# Patient Record
Sex: MALE | Race: WHITE | NOT HISPANIC OR LATINO | Employment: UNEMPLOYED | URBAN - METROPOLITAN AREA
[De-identification: names, ages, dates, MRNs, and addresses within clinical notes are randomized per-mention and may not be internally consistent; named-entity substitution may affect disease eponyms.]

---
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
## 2023-05-04 ENCOUNTER — RA CDI HCC (OUTPATIENT)
Dept: OTHER | Facility: HOSPITAL | Age: 18
End: 2023-05-04

## 2023-05-11 ENCOUNTER — OFFICE VISIT (OUTPATIENT)
Dept: PEDIATRICS CLINIC | Facility: CLINIC | Age: 18
End: 2023-05-11

## 2023-05-11 VITALS
SYSTOLIC BLOOD PRESSURE: 116 MMHG | HEIGHT: 73 IN | HEART RATE: 72 BPM | RESPIRATION RATE: 16 BRPM | DIASTOLIC BLOOD PRESSURE: 76 MMHG | BODY MASS INDEX: 31.68 KG/M2 | WEIGHT: 239 LBS | TEMPERATURE: 98.4 F

## 2023-05-11 DIAGNOSIS — E78.5 HYPERLIPIDEMIA WITH TARGET LOW DENSITY LIPOPROTEIN (LDL) CHOLESTEROL LESS THAN 130 MG/DL: ICD-10-CM

## 2023-05-11 DIAGNOSIS — M41.9 SCOLIOSIS OF THORACIC SPINE, UNSPECIFIED SCOLIOSIS TYPE: ICD-10-CM

## 2023-05-11 DIAGNOSIS — Z01.10 ENCOUNTER FOR HEARING SCREENING WITHOUT ABNORMAL FINDINGS: ICD-10-CM

## 2023-05-11 DIAGNOSIS — K13.0 CHEILITIS: ICD-10-CM

## 2023-05-11 DIAGNOSIS — Z00.00 ANNUAL PHYSICAL EXAM: Primary | ICD-10-CM

## 2023-05-11 DIAGNOSIS — Z01.00 ENCOUNTER FOR VISION SCREENING: ICD-10-CM

## 2023-05-11 DIAGNOSIS — Z13.31 ENCOUNTER FOR SCREENING FOR DEPRESSION: ICD-10-CM

## 2023-05-11 DIAGNOSIS — Z23 NEED FOR VACCINATION: ICD-10-CM

## 2023-05-11 PROBLEM — B35.3 TINEA PEDIS OF BOTH FEET: Status: RESOLVED | Noted: 2020-01-15 | Resolved: 2023-05-11

## 2023-05-11 NOTE — PATIENT INSTRUCTIONS
Sexually Transmitted Diseases   WHAT YOU NEED TO KNOW:   What is a sexually transmitted disease (STD)? An STD means signs or symptoms of a sexually transmitted infection (STI) have developed  An STI happens when bacteria or a virus are spread through oral, genital, or anal sex  Some examples of STDs are HIV, chlamydia, syphilis, and gonorrhea  What are the signs and symptoms of an STD? You may have one or more of the following, depending on the STD:  • Blisters, warts, sores, or a rash on your skin that may be painful    • Discharge from the penis, vagina, or anus that may have a foul smell    • Fever, muscle pain, or swollen lymph nodes in the groin    • Inflammation and itching    • Pelvic or abdominal pain, or pain during sex or when urinating    • Sore throat, mouth ulcers, or trouble swallowing    • Vaginal bleeding or spotting after sex (women)    What increases my risk for an STD? • Unprotected sex    • Being female    • Alcohol or illegal drug use    • More than 1 sex partner    • Not being vaccinated against certain STIs    • A weak immune system    • Open sores or cuts, such as body piercings    How is an STD diagnosed? Your healthcare provider will examine you and ask about your symptoms  He or she may ask you about your sex history or other medical conditions  He or she will ask if you have had an STD before  • Blood and urine tests  may show an infection and what kind it is  • A sample of discharge  may show what is causing your STD  • A pelvic exam  may be used if you are a woman  A pelvic exam is used to check your vagina, cervix, and other organs  How is an STD treated? Treatment depends on the STD you have  Antibiotics may be given for a bacterial infection  Antivirals may be given for a viral infection  Antifungals may be given for a fungal infection, such as a yeast infection  Early treatment may decrease the risk for certain cancers   Early treatment can also help prevent infertility  How can I help prevent the spread of an STI? Ask your healthcare provider for more information about the following safe sex practices:  • Use a male or female condom during sex  This includes oral, genital, or anal sex  Use a new condom each time  Condoms help prevent pregnancy and STIs  Use latex condoms, if possible  Lambskin (also called sheepskin or natural membrane) condoms do not protect against STIs  A polyurethane condom can be used if you or your partner is allergic to latex  Condoms should be used with a second form of birth control to help prevent pregnancy and STIs  Do not use male and female condoms together  • Do not have sex with someone who has an STI or STD  This includes oral and anal sex  • Limit sex partners  Ask about your partner's sex history before you have sex  • Get screened regularly if you are sexually active  Common tests include chlamydia, gonorrhea, HIV, hepatitis, and syphilis  • Tell your sex partners if you have an STI  Your partners may need to be tested and treated  Do not have sex while you are being treated for an STI  Do not have sex with a partner who is being treated  • Ask about medicines to lower your risk for some STIs:      ? Vaccines  can help protect you from hepatitis A, hepatitis B, and the human papillomavirus (HPV)  The HPV vaccine is usually given at 11 years, but it may be given through 26 years to both females and males  Your provider can give you more information on vaccines to prevent STIs  ? Pre-exposure prophylaxis (PrEP)  may be given if you are at high risk for HIV  PrEP is taken every day to prevent the virus from fully infecting the body  • If you are a woman, do not douche  Douching upsets the normal balance of bacteria found in your vagina  It does not prevent or clear up vaginal infections  When should I seek immediate care? • You have genital swelling or pain, or unusual bleeding      • You have joint pain, a rash, swollen lymph nodes, or night sweats  • You have severe abdominal pain  When should I call my doctor? • You have a fever  • Your symptoms do not go away or get worse, even after treatment  • You have bleeding or pain during sex  • You or your female partner may be pregnant  • You have questions or concerns about your condition or care  CARE AGREEMENT:   You have the right to help plan your care  Learn about your health condition and how it may be treated  Discuss treatment options with your healthcare providers to decide what care you want to receive  You always have the right to refuse treatment  The above information is an  only  It is not intended as medical advice for individual conditions or treatments  Talk to your doctor, nurse or pharmacist before following any medical regimen to see if it is safe and effective for you  © Copyright Chuy Caballero 2022 Information is for End User's use only and may not be sold, redistributed or otherwise used for commercial purposes  Wellness Visit for Adults   AMBULATORY CARE:   A wellness visit  is when you see your healthcare provider to get screened for health problems  Your healthcare provider will also give you advice on how to stay healthy  Write down your questions so you remember to ask them  Ask your healthcare provider how often you should have a wellness visit  What happens at a wellness visit:  Your healthcare provider will ask about your health, and your family history of health problems  This includes high blood pressure, heart disease, and cancer  He or she will ask if you have symptoms that concern you, if you smoke, and about your mood  You may also be asked about your intake of medicines, supplements, food, and alcohol  Any of the following may be done:  • Your weight  will be checked  Your height may also be checked so your body mass index (BMI) can be calculated   Your BMI shows if you are at a healthy weight  • Your blood pressure  and heart rate will be checked  Your temperature may also be checked  • Blood and urine tests  may be done  Blood tests may be done to check your cholesterol levels  Abnormal cholesterol levels increase your risk for heart disease and stroke  You may also need a blood or urine test to check for diabetes if you are at increased risk  Urine tests may be done to look for signs of an infection or kidney disease  • A physical exam  includes checking your heartbeat and lungs with a stethoscope  Your healthcare provider may also check your skin to look for sun damage  • Screening tests  may be recommended  A screening test is done to check for diseases that may not cause symptoms  The screening tests you may need depend on your age, gender, family history, and lifestyle habits  For example, colorectal screening may be recommended if you are 48years old or older  Screening tests you need if you are a woman:   • A Pap smear  is used to screen for cervical cancer  Pap smears are usually done every 3 to 5 years depending on your age  You may need them more often if you have had abnormal Pap smear test results in the past  Ask your healthcare provider how often you should have a Pap smear  • A mammogram  is an x-ray of your breasts to screen for breast cancer  Experts recommend mammograms every 2 years starting at age 48 years  You may need a mammogram at age 52 years or younger if you have an increased risk for breast cancer  Talk to your healthcare provider about when you should start having mammograms and how often you need them  Vaccines you may need:   • Get an influenza vaccine  every year  The influenza vaccine protects you from the flu  Several types of viruses cause the flu  The viruses change over time, so new vaccines are made each year  • Get a tetanus-diphtheria (Td) booster vaccine  every 10 years  This vaccine protects you against tetanus and diphtheria  Tetanus is a severe infection that may cause painful muscle spasms and lockjaw  Diphtheria is a severe bacterial infection that causes a thick covering in the back of your mouth and throat  • Get a human papillomavirus (HPV) vaccine  if you are female and aged 23 to 32 or male 23 to 24 and never received it  This vaccine protects you from HPV infection  HPV is the most common infection spread by sexual contact  HPV may also cause vaginal, penile, and anal cancers  • Get a pneumococcal vaccine  if you are aged 72 years or older  The pneumococcal vaccine is an injection given to protect you from pneumococcal disease  Pneumococcal disease is an infection caused by pneumococcal bacteria  The infection may cause pneumonia, meningitis, or an ear infection  • Get a shingles vaccine  if you are 60 or older, even if you have had shingles before  The shingles vaccine is an injection to protect you from the varicella-zoster virus  This is the same virus that causes chickenpox  Shingles is a painful rash that develops in people who had chickenpox or have been exposed to the virus  How to eat healthy:  My Plate is a model for planning healthy meals  It shows the types and amounts of foods that should go on your plate  Fruits and vegetables make up about half of your plate, and grains and protein make up the other half  A serving of dairy is included on the side of your plate  The amount of calories and serving sizes you need depends on your age, gender, weight, and height  Examples of healthy foods are listed below:  • Eat a variety of vegetables  such as dark green, red, and orange vegetables  You can also include canned vegetables low in sodium (salt) and frozen vegetables without added butter or sauces  • Eat a variety of fresh fruits , canned fruit in 100% juice, frozen fruit, and dried fruit  • Include whole grains  At least half of the grains you eat should be whole grains   Examples include whole-wheat bread, wheat pasta, brown rice, and whole-grain cereals such as oatmeal     • Eat a variety of protein foods such as seafood (fish and shellfish), lean meat, and poultry without skin (turkey and chicken)  Examples of lean meats include pork leg, shoulder, or tenderloin, and beef round, sirloin, tenderloin, and extra lean ground beef  Other protein foods include eggs and egg substitutes, beans, peas, soy products, nuts, and seeds  • Choose low-fat dairy products such as skim or 1% milk or low-fat yogurt, cheese, and cottage cheese  • Limit unhealthy fats  such as butter, hard margarine, and shortening  Exercise:  Exercise at least 30 minutes per day on most days of the week  Some examples of exercise include walking, biking, dancing, and swimming  You can also fit in more physical activity by taking the stairs instead of the elevator or parking farther away from stores  Include muscle strengthening activities 2 days each week  Regular exercise provides many health benefits  It helps you manage your weight, and decreases your risk for type 2 diabetes, heart disease, stroke, and high blood pressure  Exercise can also help improve your mood  Ask your healthcare provider about the best exercise plan for you  General health and safety guidelines:   • Do not smoke  Nicotine and other chemicals in cigarettes and cigars can cause lung damage  Ask your healthcare provider for information if you currently smoke and need help to quit  E-cigarettes or smokeless tobacco still contain nicotine  Talk to your healthcare provider before you use these products  • Limit alcohol  A drink of alcohol is 12 ounces of beer, 5 ounces of wine, or 1½ ounces of liquor  • Lose weight, if needed  Being overweight increases your risk of certain health conditions  These include heart disease, high blood pressure, type 2 diabetes, and certain types of cancer  • Protect your skin  Do not sunbathe or use tanning beds  Use sunscreen with a SPF 15 or higher  Apply sunscreen at least 15 minutes before you go outside  Reapply sunscreen every 2 hours  Wear protective clothing, hats, and sunglasses when you are outside  • Drive safely  Always wear your seatbelt  Make sure everyone in your car wears a seatbelt  A seatbelt can save your life if you are in an accident  Do not use your cell phone when you are driving  This could distract you and cause an accident  Pull over if you need to make a call or send a text message  • Practice safe sex  Use latex condoms if are sexually active and have more than one partner  Your healthcare provider may recommend screening tests for sexually transmitted infections (STIs)  • Wear helmets, lifejackets, and protective gear  Always wear a helmet when you ride a bike or motorcycle, go skiing, or play sports that could cause a head injury  Wear protective equipment when you play sports  Wear a lifejacket when you are on a boat or doing water sports  © Copyright Corinne Furry 2022 Information is for End User's use only and may not be sold, redistributed or otherwise used for commercial purposes  The above information is an  only  It is not intended as medical advice for individual conditions or treatments  Talk to your doctor, nurse or pharmacist before following any medical regimen to see if it is safe and effective for you

## 2023-05-11 NOTE — PROGRESS NOTES
[id number]    NAME: Miguel Angel Webster  AGE: 21 y o  SEX: male  : 2005     DATE: 2023     Assessment and Plan:     Problem List Items Addressed This Visit        Digestive    Cheilitis    Relevant Medications    mupirocin (BACTROBAN) 2 % ointment       Musculoskeletal and Integument    Scoliosis of thoracic spine       Other    BMI 31 0-31 9,adult    Encounter for hearing screening without abnormal findings - Primary    Hyperlipidemia with target low density lipoprotein (LDL) cholesterol less than 130 mg/dL    Relevant Orders    Lipid panel    Need for vaccination    Relevant Orders    MENINGOCOCCAL B RECOMBINANT (Completed)       Immunizations and preventive care screenings were discussed with patient today  Appropriate education was printed on patient's after visit summary  Counseling:  Injury prevention: discussed safety/seat belts, safety helmets, smoke detectors, carbon dioxide detectors, and smoking near bedding or upholstery  Exercise: the importance of regular exercise/physical activity was discussed  Recommend exercise 3-5 times per week for at least 30 minutes  · CVD PREVENTION    BMI Counseling: Body mass index is 31 53 kg/m²  The BMI is above normal  Nutrition recommendations include encouraging healthy choices of fruits and vegetables, decreasing fast food intake, consuming healthier snacks, limiting drinks that contain sugar, increasing intake of lean protein and reducing intake of saturated and trans fat  Exercise recommendations include vigorous physical activity 75 minutes/week and strength training exercises  No pharmacotherapy was ordered  Rationale for BMI follow-up plan is due to patient being overweight or obese  Depression Screening and Follow-up Plan: Patient was screened for depression during today's encounter  They screened negative with a PHQ-2 score of 0          Return in about 1 year (around 5/11/2024) for Annual physical      Chief Complaint:     Chief Complaint   Patient presents with   • Well Child     17 year well       History of Present Illness:     Adult Annual Physical   Patient here for a comprehensive physical exam  The patient reports no problems  The patient plays football in college  No problems playing, no recent injuries  Diet and Physical Activity  · Diet/Nutrition: well balanced diet  · Exercise: vigorous cardiovascular exercise  Depression Screening  PHQ-2/9 Depression Screening    Little interest or pleasure in doing things: 0 - not at all  Feeling down, depressed, or hopeless: 0 - not at all  PHQ-2 Score: 0  PHQ-2 Interpretation: Negative depression screen       General Health  · Sleep: sleeps well  · Hearing: normal - bilateral   · Vision: wears glasses  · Dental: regular dental visits   Health  · History of STDs?: no   Denies sexual activity     Review of Systems:     Review of Systems   Constitutional: Negative for chills and fever  HENT: Negative for ear pain and sore throat  Eyes: Negative for pain and visual disturbance  Respiratory: Negative for cough and shortness of breath  Cardiovascular: Negative for chest pain and palpitations  Gastrointestinal: Negative for abdominal pain and vomiting  Genitourinary: Negative for dysuria and hematuria  Musculoskeletal: Negative for arthralgias and back pain  Skin: Negative for color change and rash  Neurological: Negative for seizures and syncope  All other systems reviewed and are negative  Past Medical History:     History reviewed  No pertinent past medical history     Past Surgical History:     Past Surgical History:   Procedure Laterality Date   • CIRCUMCISION     • OTHER SURGICAL HISTORY      Ankle      Social History:     Social History     Socioeconomic History   • Marital status: Single     Spouse name: None   • Number of children: None   • Years of education: None   • "Highest education level: None   Occupational History   • None   Tobacco Use   • Smoking status: Never   • Smokeless tobacco: Never   Substance and Sexual Activity   • Alcohol use: No   • Drug use: No   • Sexual activity: None   Other Topics Concern   • None   Social History Narrative    Lives with parents,     Older sister    Pets 2 dogs     Social Determinants of Health     Financial Resource Strain: Not on file   Food Insecurity: Not on file   Transportation Needs: Not on file   Physical Activity: Not on file   Stress: Not on file   Social Connections: Not on file   Intimate Partner Violence: Not on file   Housing Stability: Not on file      Family History:     Family History   Problem Relation Age of Onset   • No Known Problems Mother    • Hypertension Father    • No Known Problems Sister    • No Known Problems Maternal Grandmother    • Heart attack Maternal Grandfather    • Heart disease Maternal Grandfather    • Diabetes type II Paternal Grandmother    • Diabetes type II Paternal Grandfather    • Alcohol abuse Neg Hx    • Mental illness Neg Hx    • Drug abuse Neg Hx       Current Medications:     Current Outpatient Medications   Medication Sig Dispense Refill   • mupirocin (BACTROBAN) 2 % ointment Apply topically 3 (three) times a day 22 g 0     No current facility-administered medications for this visit  Allergies:     No Known Allergies   Physical Exam:     /76   Pulse 72   Temp 98 4 °F (36 9 °C)   Resp 16   Ht 6' 1\" (1 854 m)   Wt 108 kg (239 lb)   BMI 31 53 kg/m²     Physical Exam  Vitals and nursing note reviewed  Constitutional:       General: He is not in acute distress  Appearance: He is well-developed  HENT:      Head: Normocephalic and atraumatic  Comments: Dry fissures in the corners of the mouth  Eyes:      Conjunctiva/sclera: Conjunctivae normal    Cardiovascular:      Rate and Rhythm: Normal rate and regular rhythm        Heart sounds: No murmur " heard   Pulmonary:      Effort: Pulmonary effort is normal  No respiratory distress  Breath sounds: Normal breath sounds  Abdominal:      Palpations: Abdomen is soft  Tenderness: There is no abdominal tenderness  Musculoskeletal:         General: No swelling  Cervical back: Neck supple  Comments: Scoliosis as before without progression   Skin:     General: Skin is warm and dry  Capillary Refill: Capillary refill takes less than 2 seconds  Neurological:      Mental Status: He is alert  Psychiatric:         Mood and Affect: Mood normal          Behavior: Behavior normal      Visit discussion    Lipid panel ordered to evaluate for previous finding of hyperlipidemia  Patient reports that he intends to gain weight to improve his position in football    Discussed with the patient detrimental effect of obesity, early cardiovascular disease    Discouraged from excessive weight gain    Jolly Woods MD   [facility]

## 2023-05-15 ENCOUNTER — TELEPHONE (OUTPATIENT)
Dept: PEDIATRICS CLINIC | Facility: CLINIC | Age: 18
End: 2023-05-15

## 2023-05-15 NOTE — TELEPHONE ENCOUNTER
----- Message from Timmy Crowder MD sent at 5/12/2023 10:53 AM EDT -----  Ldl, (bad cholesterol) is slightly elevated at the same level as before    Continue regular physical activity    Healthy diet with fruit, vegetables, decrease quantity of saturated fats  Repeat labs in 1 y